# Patient Record
Sex: FEMALE | Race: BLACK OR AFRICAN AMERICAN | URBAN - METROPOLITAN AREA
[De-identification: names, ages, dates, MRNs, and addresses within clinical notes are randomized per-mention and may not be internally consistent; named-entity substitution may affect disease eponyms.]

---

## 2024-11-14 ENCOUNTER — HOSPITAL ENCOUNTER (EMERGENCY)
Age: 39
Discharge: OTHER INSTITUTION | End: 2024-11-14
Attending: EMERGENCY MEDICINE

## 2024-11-14 DIAGNOSIS — I46.9 CARDIOPULMONARY ARREST: Primary | ICD-10-CM

## 2024-11-14 PROCEDURE — 99283 EMERGENCY DEPT VISIT LOW MDM: CPT

## 2024-11-14 NOTE — ED PROVIDER NOTES
bradycardic PEA.  Patient was given an amp of bicarb through the prehospital IO but this was noted to have been infiltrated.  Nursing staff was able to establish IV access and the patient was given a milligram of epi and an amp of calcium chloride but the patient remained in PEA.  At this point, the patient's downtime was approximately 1 hour and it was felt that any further resuscitative efforts would be futile and the patient was pronounced at 4:12 AM.    Is this patient to be included in the SEP-1 core measure? No Exclusion criteria - the patient is NOT to be included for SEP-1 Core Measure due to: Infection is not suspected    Medical Decision Making  Problems Addressed:  Cardiopulmonary arrest: acute illness or injury      Clinical Impression     1. Cardiopulmonary arrest        Disposition     PATIENT REFERRED TO:  No follow-up provider specified.    DISCHARGE MEDICATIONS:  New Prescriptions    No medications on file       DISPOSITION  2024 04:16:12 AM             Diagnostic Results and Other Data       MOST RECENT VITALS:   , ,  ,  ,       Procedures     N/A    ED Course     Nursing Notes, Past Medical Hx, Past Surgical Hx, Social Hx,Allergies, and Family Hx were reviewed.         The patient was given the following medications:  No orders of the defined types were placed in this encounter.      CONSULTS:  None    Review of Systems     Review of Systems   Unable to perform ROS: Other (Cardiac arrest)       Past Medical, Surgical, Family, and Social History     She has no past medical history on file.  She has no past surgical history on file.  Her family history is not on file.  She     Medications     Previous Medications    No medications on file       Allergies     She has no allergies on file.    Physical Exam     INITIAL VITALS:  ,  ,  ,  ,     Physical Exam  Vitals and nursing note reviewed.   Constitutional:       Comments: Patient unresponsive with Igel in place, undergoing chest

## 2024-11-14 NOTE — ED NOTES
[facility] called back at 0557 and stated they would take the patient. [facility] called back and updated at 0547.      Db Ramirez RN  11/14/24 0679

## 2024-11-14 NOTE — ED NOTES
Last dose of epi from squad was at 0400. EMS states they worked up patient for roughly 40 minutes before arrival and administered 4 rounds of Epi with no rhythm change.     Pt arrived to the emergency department at 0402. Pt was transferred to ED Bed in Trauma bay 2. Patient was on Gorge device upon arrival, placed on our monitor, IO was placed en route to ER. Tibia but was infiltrated at 0405 when used. IV access was gained in Right Wrist and Left hand, Calcium and Bicarb were pulled from crash cart Bicarb was given 0405 but infiltrated IO, Epi and Calcium were given at 0411 when access was gained.     Pulse checks were made at 0403 when patient was moved to trauma bed, 0405, 0407, 0409, 0411, and 0412. TOD is 0412     Db Ramirez, RN  11/14/24 1332

## 2024-11-14 NOTE — ED NOTES
[facility] Called at 0440 -    Called at 0450 - Will call us back to let us know if [facility] case or not.   Next of Kin, Mother Crystal Burton called by RN at 0508, Phone number [phone]  Patient's Ex , Dale Garcia was also called by MD at 0518, Phone number [phone].      Db Ramirez RN  11/14/24 4388